# Patient Record
Sex: MALE | Race: WHITE | NOT HISPANIC OR LATINO | URBAN - METROPOLITAN AREA
[De-identification: names, ages, dates, MRNs, and addresses within clinical notes are randomized per-mention and may not be internally consistent; named-entity substitution may affect disease eponyms.]

---

## 2023-04-24 ENCOUNTER — OFFICE VISIT (OUTPATIENT)
Dept: URGENT CARE | Facility: URGENT CARE | Age: 43
End: 2023-04-24
Payer: COMMERCIAL

## 2023-04-24 ENCOUNTER — ANCILLARY PROCEDURE (OUTPATIENT)
Dept: GENERAL RADIOLOGY | Facility: CLINIC | Age: 43
End: 2023-04-24
Attending: NURSE PRACTITIONER
Payer: COMMERCIAL

## 2023-04-24 VITALS
SYSTOLIC BLOOD PRESSURE: 147 MMHG | RESPIRATION RATE: 18 BRPM | DIASTOLIC BLOOD PRESSURE: 83 MMHG | TEMPERATURE: 100.3 F | WEIGHT: 179 LBS | HEART RATE: 104 BPM | OXYGEN SATURATION: 98 %

## 2023-04-24 DIAGNOSIS — R53.81 MALAISE: ICD-10-CM

## 2023-04-24 DIAGNOSIS — R09.89 RHONCHI AT RIGHT LUNG BASE: ICD-10-CM

## 2023-04-24 DIAGNOSIS — J18.9 PNEUMONIA DUE TO INFECTIOUS ORGANISM, UNSPECIFIED LATERALITY, UNSPECIFIED PART OF LUNG: Primary | ICD-10-CM

## 2023-04-24 DIAGNOSIS — J45.40 MODERATE PERSISTENT ASTHMA DEPENDENT ON SYSTEMIC STEROIDS: ICD-10-CM

## 2023-04-24 DIAGNOSIS — R05.1 ACUTE COUGH: ICD-10-CM

## 2023-04-24 DIAGNOSIS — Z79.52 MODERATE PERSISTENT ASTHMA DEPENDENT ON SYSTEMIC STEROIDS: ICD-10-CM

## 2023-04-24 PROBLEM — R35.0 INCREASED FREQUENCY OF URINATION: Status: ACTIVE | Noted: 2019-01-03

## 2023-04-24 PROBLEM — I86.1 LEFT VARICOCELE: Status: ACTIVE | Noted: 2018-01-04

## 2023-04-24 PROBLEM — K76.0 HEPATIC STEATOSIS: Status: ACTIVE | Noted: 2019-01-03

## 2023-04-24 PROBLEM — K29.70 HELICOBACTER POSITIVE GASTRITIS: Status: ACTIVE | Noted: 2019-04-09

## 2023-04-24 PROBLEM — M89.8X7: Status: ACTIVE | Noted: 2018-10-17

## 2023-04-24 PROBLEM — B35.4 TINEA CORPORIS: Status: ACTIVE | Noted: 2021-02-20

## 2023-04-24 PROBLEM — M22.2X1 PATELLOFEMORAL PAIN SYNDROME OF BOTH KNEES: Status: ACTIVE | Noted: 2018-08-07

## 2023-04-24 PROBLEM — M17.11 PRIMARY OSTEOARTHRITIS OF RIGHT KNEE: Status: ACTIVE | Noted: 2023-02-09

## 2023-04-24 PROBLEM — M22.2X2 PATELLOFEMORAL PAIN SYNDROME OF BOTH KNEES: Status: ACTIVE | Noted: 2018-08-07

## 2023-04-24 PROBLEM — R00.2 PALPITATIONS: Status: ACTIVE | Noted: 2018-08-07

## 2023-04-24 PROBLEM — M79.10 MYALGIA: Status: ACTIVE | Noted: 2019-03-27

## 2023-04-24 PROBLEM — K21.9 GASTROESOPHAGEAL REFLUX DISEASE: Status: ACTIVE | Noted: 2019-03-27

## 2023-04-24 PROBLEM — B96.81 HELICOBACTER POSITIVE GASTRITIS: Status: ACTIVE | Noted: 2019-04-09

## 2023-04-24 LAB
DEPRECATED S PYO AG THROAT QL EIA: NEGATIVE
FLUAV AG SPEC QL IA: NEGATIVE
FLUBV AG SPEC QL IA: NEGATIVE
GROUP A STREP BY PCR: NOT DETECTED
RADIOLOGIST FLAGS: ABNORMAL

## 2023-04-24 PROCEDURE — 71046 X-RAY EXAM CHEST 2 VIEWS: CPT | Mod: TC | Performed by: RADIOLOGY

## 2023-04-24 PROCEDURE — 87651 STREP A DNA AMP PROBE: CPT | Performed by: NURSE PRACTITIONER

## 2023-04-24 PROCEDURE — 87804 INFLUENZA ASSAY W/OPTIC: CPT | Performed by: NURSE PRACTITIONER

## 2023-04-24 PROCEDURE — 99204 OFFICE O/P NEW MOD 45 MIN: CPT | Performed by: NURSE PRACTITIONER

## 2023-04-24 RX ORDER — PREDNISONE 20 MG/1
20 TABLET ORAL DAILY
Qty: 5 TABLET | Refills: 0 | Status: SHIPPED | OUTPATIENT
Start: 2023-04-24

## 2023-04-24 RX ORDER — BUDESONIDE AND FORMOTEROL FUMARATE DIHYDRATE 160; 4.5 UG/1; UG/1
2 AEROSOL RESPIRATORY (INHALATION)
COMMUNITY
Start: 2022-10-23

## 2023-04-24 RX ORDER — FLUTICASONE PROPIONATE 50 MCG
1 SPRAY, SUSPENSION (ML) NASAL DAILY
Qty: 16 G | Refills: 0 | Status: SHIPPED | OUTPATIENT
Start: 2023-04-24

## 2023-04-24 RX ORDER — DOXYCYCLINE HYCLATE 100 MG
100 TABLET ORAL 2 TIMES DAILY
Qty: 14 TABLET | Refills: 0 | Status: SHIPPED | OUTPATIENT
Start: 2023-04-24 | End: 2023-05-01

## 2023-04-24 NOTE — RESULT ENCOUNTER NOTE
Results discussed with patient in clinic. States understanding of these results.    Dai Mcfarlane CNP

## 2023-04-24 NOTE — PROGRESS NOTES
Assessment & Plan     1. Pneumonia due to infectious organism, unspecified laterality, unspecified part of lung    - predniSONE (DELTASONE) 20 MG tablet; Take 1 tablet (20 mg) by mouth daily  Dispense: 5 tablet; Refill: 0  - fluticasone (FLONASE) 50 MCG/ACT nasal spray; Spray 1 spray into both nostrils daily  Dispense: 16 g; Refill: 0  - doxycycline hyclate (VIBRA-TABS) 100 MG tablet; Take 1 tablet (100 mg) by mouth 2 times daily for 7 days  Dispense: 14 tablet; Refill: 0    2. Moderate persistent asthma dependent on systemic steroids    - Streptococcus A Rapid Screen w/Reflex to PCR - Clinic Collect  - Influenza A/B antigen  - XR Chest 2 Views  - Group A Streptococcus PCR Throat Swab  - predniSONE (DELTASONE) 20 MG tablet; Take 1 tablet (20 mg) by mouth daily  Dispense: 5 tablet; Refill: 0  - fluticasone (FLONASE) 50 MCG/ACT nasal spray; Spray 1 spray into both nostrils daily  Dispense: 16 g; Refill: 0  - doxycycline hyclate (VIBRA-TABS) 100 MG tablet; Take 1 tablet (100 mg) by mouth 2 times daily for 7 days  Dispense: 14 tablet; Refill: 0    3. Acute cough    - Streptococcus A Rapid Screen w/Reflex to PCR - Clinic Collect  - Influenza A/B antigen  - XR Chest 2 Views  - Group A Streptococcus PCR Throat Swab    4. Malaise    - Streptococcus A Rapid Screen w/Reflex to PCR - Clinic Collect  - Influenza A/B antigen  - XR Chest 2 Views  - Group A Streptococcus PCR Throat Swab    5. Rhonchi at right lung base    - XR Chest 2 Views    Home instructions reviewed recommend oral prednisone for asthma exacerbation, oral antibiotic as prescribed for pneumonia.   May use flonase daily to help decrease swelling and dry up drainage. Recommend saline lavage to help remove mucous and moisturize sinuses.     Close follow up with primary care in 3-4 weeks to have repeat chest xray.     ZAHIDA Cabrera [facility]    Dari Benoit is a 43 year old male who presents to clinic today for the  following health issues:  Chief Complaint   Patient presents with     Urgent Care     URI     Per patient states he started having cold the past three days congestion, body aches, headaches and cough dry. Patient states he has a history of asthma and has been using his inhaler.      HPI    Patient with history of moderate asthma, states he has been having to use inhaler more frequently. He denies SOB. Notes his children and wife have been ill with colds and respiratory infection. He states he has cough, wheezing, SOB, malaise unsure if febrile.       Review of Systems  Constitutional, HEENT, cardiovascular, pulmonary, gi and gu systems are negative, except as otherwise noted.      Objective    BP (!) 147/83   Pulse 104   Temp 100.3  F (37.9  C) (Tympanic)   Resp 18   Wt 81.2 kg (179 lb)   SpO2 98%   Physical Exam   GENERAL: alert and no distress  EYES: Eyes grossly normal to inspection, PERRL and conjunctivae and sclerae normal  HENT: normal cephalic/atraumatic, ear canals and TM's normal, nose and mouth without ulcers or lesions, nasal mucosa edematous , rhinorrhea clear and yellow, oropharynx clear, oral mucous membranes moist, tonsillar erythema and sinuses: maxillary, frontal tenderness on bilateral  NECK: bilateral anterior cervical adenopathy, no asymmetry, masses, or scars and thyroid normal to palpation  RESP: rhonchi R mid posterior and R lower posterior and expiratory wheezes throughout  CV: regular rate and rhythm, normal S1 S2, no S3 or S4, no murmur, click or rub, no peripheral edema and peripheral pulses strong  ABDOMEN: soft, nontender, no hepatosplenomegaly, no masses and bowel sounds normal  MS: no gross musculoskeletal defects noted, no edema  SKIN: no suspicious lesions or rashes    Results for orders placed or performed in visit on 04/24/23   XR Chest 2 Views     Status: Abnormal   Result Value Ref Range    Radiologist flags See above report. (Urgent)     Narrative    EXAM: XR CHEST 2  VIEWS  [facility]  DATE/TIME: 4/24/2023 4:31 PM CDT    INDICATION:  Moderate persistent asthma dependent on systemic steroids, Acute cough, Malaise, Rhonchi at right lung base  COMPARISON: None.      Impression    IMPRESSION: Patchy airspace opacities are noted in the lingula abutting the fissure and partially obscuring the heart border consistent with a lingular pneumonia.     Suggest a follow-up post treatment in 3-4 weeks.    No effusions. Right lung is clear. Heart and pulmonary vascularity are normal.      [Access Center: See above report.]    This report will be copied to the [facility] to ensure a provider acknowledges the finding. [facility] Center is available Monday through Friday 8am-3:30 pm.      Streptococcus A Rapid Screen w/Reflex to PCR - Clinic Collect     Status: Normal    Specimen: Throat; Swab   Result Value Ref Range    Group A Strep antigen Negative Negative   Influenza A/B antigen     Status: Normal    Specimen: Nose; Swab   Result Value Ref Range    Influenza A antigen Negative Negative    Influenza B antigen Negative Negative    Narrative    Test results must be correlated with clinical data. If necessary, results should be confirmed by a molecular assay or viral culture.

## 2023-05-08 ENCOUNTER — OFFICE VISIT (OUTPATIENT)
Dept: URGENT CARE | Facility: URGENT CARE | Age: 43
End: 2023-05-08
Payer: COMMERCIAL

## 2023-05-08 VITALS
RESPIRATION RATE: 16 BRPM | DIASTOLIC BLOOD PRESSURE: 81 MMHG | OXYGEN SATURATION: 98 % | TEMPERATURE: 98.7 F | SYSTOLIC BLOOD PRESSURE: 123 MMHG | WEIGHT: 179 LBS | HEART RATE: 106 BPM

## 2023-05-08 DIAGNOSIS — R11.10 VOMITING AND DIARRHEA: Primary | ICD-10-CM

## 2023-05-08 DIAGNOSIS — R19.7 VOMITING AND DIARRHEA: Primary | ICD-10-CM

## 2023-05-08 LAB
DEPRECATED S PYO AG THROAT QL EIA: NEGATIVE
GROUP A STREP BY PCR: NOT DETECTED

## 2023-05-08 PROCEDURE — 99213 OFFICE O/P EST LOW 20 MIN: CPT | Performed by: FAMILY MEDICINE

## 2023-05-08 PROCEDURE — 87651 STREP A DNA AMP PROBE: CPT | Performed by: FAMILY MEDICINE

## 2023-05-08 RX ORDER — BENZONATATE 100 MG/1
CAPSULE ORAL
COMMUNITY
Start: 2023-05-01

## 2023-05-08 RX ORDER — ONDANSETRON 4 MG/1
4 TABLET, ORALLY DISINTEGRATING ORAL EVERY 8 HOURS PRN
Qty: 6 TABLET | Refills: 0 | Status: SHIPPED | OUTPATIENT
Start: 2023-05-08

## 2023-05-08 NOTE — PATIENT INSTRUCTIONS
Take the medicine for nausea and vomiting.    Drink plenty of fluids    Eats when no longer vomiting and taking liquids.

## 2023-05-08 NOTE — PROGRESS NOTES
Assessment & Plan     Vomiting and diarrhea  Neg strep, viral GI disease, zofran  - ondansetron (ZOFRAN ODT) 4 MG ODT tab  Dispense: 6 tablet; Refill: 0             No follow-ups on file.    Hamilton Spicer MD  [facility] [place]    Subjective     Cy is a 43 year old male who presents to clinic today for the following health issues:  Chief Complaint   Patient presents with     Urgent Care     Vomiting     Per patient states symptoms started yesterday      HPI    Vomited yesterday.  Stomach pain.  Nausea.  Not able to drink as feels like vomiting.    Diarrhea today.  Son with same illness.        Review of Systems        Objective    /81   Pulse 106   Temp 98.7  F (37.1  C) (Tympanic)   Resp 16   Wt 81.2 kg (179 lb)   SpO2 98%   Physical Exam  Vitals and nursing note reviewed.   Constitutional:       Appearance: Normal appearance.   HENT:      Right Ear: Tympanic membrane normal.      Left Ear: Tympanic membrane normal.      Mouth/Throat:      Mouth: Mucous membranes are moist.   Eyes:      Pupils: Pupils are equal, round, and reactive to light.   Cardiovascular:      Rate and Rhythm: Normal rate and regular rhythm.      Pulses: Normal pulses.      Heart sounds: Normal heart sounds.   Pulmonary:      Effort: Pulmonary effort is normal.      Breath sounds: Normal breath sounds.   Abdominal:      General: Abdomen is flat.      Palpations: Abdomen is soft.      Tenderness: There is abdominal tenderness.   Musculoskeletal:      Cervical back: Neck supple.   Neurological:      Mental Status: He is alert.

## 2023-05-25 ENCOUNTER — OFFICE VISIT (OUTPATIENT)
Dept: URGENT CARE | Facility: URGENT CARE | Age: 43
End: 2023-05-25
Payer: COMMERCIAL

## 2023-05-25 ENCOUNTER — ANCILLARY PROCEDURE (OUTPATIENT)
Dept: GENERAL RADIOLOGY | Facility: CLINIC | Age: 43
End: 2023-05-25
Attending: NURSE PRACTITIONER
Payer: COMMERCIAL

## 2023-05-25 VITALS
TEMPERATURE: 98.4 F | HEART RATE: 126 BPM | OXYGEN SATURATION: 98 % | SYSTOLIC BLOOD PRESSURE: 122 MMHG | DIASTOLIC BLOOD PRESSURE: 80 MMHG | RESPIRATION RATE: 30 BRPM | WEIGHT: 181.4 LBS

## 2023-05-25 DIAGNOSIS — R07.9 CHEST PAIN, UNSPECIFIED TYPE: ICD-10-CM

## 2023-05-25 DIAGNOSIS — R06.82 TACHYPNEA: ICD-10-CM

## 2023-05-25 DIAGNOSIS — R05.1 ACUTE COUGH: ICD-10-CM

## 2023-05-25 DIAGNOSIS — R42 DIZZINESS: ICD-10-CM

## 2023-05-25 DIAGNOSIS — R00.0 TACHYCARDIA: ICD-10-CM

## 2023-05-25 DIAGNOSIS — R06.02 SHORTNESS OF BREATH: ICD-10-CM

## 2023-05-25 DIAGNOSIS — R07.9 CHEST PAIN, UNSPECIFIED TYPE: Primary | ICD-10-CM

## 2023-05-25 PROCEDURE — 93000 ELECTROCARDIOGRAM COMPLETE: CPT | Performed by: NURSE PRACTITIONER

## 2023-05-25 PROCEDURE — 99214 OFFICE O/P EST MOD 30 MIN: CPT | Performed by: NURSE PRACTITIONER

## 2023-05-25 PROCEDURE — 71046 X-RAY EXAM CHEST 2 VIEWS: CPT | Mod: TC | Performed by: RADIOLOGY

## 2023-05-25 NOTE — PROGRESS NOTES
Assessment & Plan     Chest pain, unspecified type  - XR Chest 2 Views  - EKG 12-lead complete w/read - Clinics    Dizziness    Shortness of breath  - XR Chest 2 Views    Acute cough  - XR Chest 2 Views    Tachycardia    Tachypnea       Reviewed EKG completed during visit showing sinus rhythm rate 90 without obvious ST elevation or depression. Reviewed xray images showing no obvious pneumonia, will notify if radiologist sees differently. Recommend further evaluation in emergency for shortness of breath, chest pain, dizziness with tachycardia and tachypnea as cannot rule out MI/PE in urgent care which can be life threatening. Patient agreeable and declines ambulance. He is discharged in stable condition           Maribel Marie NP  [facility]    Dari Benoit is a 43 year old male who presents to clinic today for the following health issues:  Chief Complaint   Patient presents with     Cough     Cough x1 week, sob when walking, pain in chest/back, dizziness from coughing      Patient presents for evaluation of cough. Symptoms have been present for the past week and have been worsening. Associated symptoms: chest pain, shortness of breath, dizziness. Denies fever, nasal congestion. He tested negative for COVID. He feels short of breath even with walking or talking at rest.     He was treated with doxycyline, flonase and prednisone 4/24/23 for pneumonia.     Problem list, Medication list, Allergies, and Medical history reviewed in EPIC.    ROS:  Review of systems negative except for noted above        Objective    /80   Pulse (!) 126   Temp 98.4  F (36.9  C) (Tympanic)   Resp 30   Wt 82.3 kg (181 lb 6.4 oz)   SpO2 98%   Physical Exam  Constitutional:       General: He is not in acute distress.     Appearance: He is not toxic-appearing or diaphoretic.   HENT:      Head: Normocephalic and atraumatic.      Right Ear: Tympanic membrane, ear canal and external ear normal.       Left Ear: Tympanic membrane, ear canal and external ear normal.      Nose: Nose normal.      Mouth/Throat:      Mouth: Mucous membranes are moist.      Pharynx: Oropharynx is clear. No oropharyngeal exudate or posterior oropharyngeal erythema.   Eyes:      Extraocular Movements: Extraocular movements intact.      Conjunctiva/sclera: Conjunctivae normal.      Pupils: Pupils are equal, round, and reactive to light.   Cardiovascular:      Rate and Rhythm: Regular rhythm. Tachycardia present.      Heart sounds: Normal heart sounds.   Pulmonary:      Effort: No respiratory distress.      Breath sounds: No wheezing, rhonchi or rales.      Comments: Tachypnea, increased respiratory effort, having to catch breath while talking, episodic coughing  Chest:      Chest wall: No tenderness.   Skin:     General: Skin is warm and dry.   Neurological:      General: No focal deficit present.      Mental Status: He is alert and oriented to person, place, and time.      Cranial Nerves: No cranial nerve deficit.      Sensory: No sensory deficit.      Motor: No weakness.      Coordination: Coordination normal.      Gait: Gait normal.          EKG completed during visit showing sinus rhythm rate 90 without obvious ST elevation or depression    X-ray chest was performed and reviewed independently by myself showing no obvious pneumonia  Radiologist impression:   Results for orders placed or performed in visit on 05/25/23   XR Chest 2 Views     Status: None    Narrative    EXAM: XR CHEST 2 VIEWS  LOCATION: [facility]  DATE/TIME: 5/25/2023 6:20 PM CDT    INDICATION: Cough, chest pain, shortness of breath.  COMPARISON: 04/24/2023.      Impression    IMPRESSION: No focal airspace consolidation. Interval resolution of previous lingular pneumonia. No pleural effusion or pneumothorax.    Cardiomediastinal silhouette is normal.

## 2023-05-30 DIAGNOSIS — J18.9 PNEUMONIA DUE TO INFECTIOUS ORGANISM, UNSPECIFIED LATERALITY, UNSPECIFIED PART OF LUNG: ICD-10-CM

## 2023-05-30 DIAGNOSIS — Z79.52 MODERATE PERSISTENT ASTHMA DEPENDENT ON SYSTEMIC STEROIDS: ICD-10-CM

## 2023-05-30 DIAGNOSIS — J45.40 MODERATE PERSISTENT ASTHMA DEPENDENT ON SYSTEMIC STEROIDS: ICD-10-CM

## 2023-06-01 RX ORDER — FLUTICASONE PROPIONATE 50 MCG
SPRAY, SUSPENSION (ML) NASAL
Qty: 16 G | Refills: 0 | OUTPATIENT
Start: 2023-06-01

## 2024-02-24 ENCOUNTER — ANCILLARY PROCEDURE (OUTPATIENT)
Dept: GENERAL RADIOLOGY | Facility: CLINIC | Age: 44
End: 2024-02-24
Attending: FAMILY MEDICINE
Payer: COMMERCIAL

## 2024-02-24 ENCOUNTER — OFFICE VISIT (OUTPATIENT)
Dept: URGENT CARE | Facility: URGENT CARE | Age: 44
End: 2024-02-24
Payer: COMMERCIAL

## 2024-02-24 VITALS
RESPIRATION RATE: 14 BRPM | DIASTOLIC BLOOD PRESSURE: 97 MMHG | OXYGEN SATURATION: 98 % | SYSTOLIC BLOOD PRESSURE: 144 MMHG | TEMPERATURE: 97.1 F | WEIGHT: 187.6 LBS | HEART RATE: 94 BPM

## 2024-02-24 DIAGNOSIS — R05.9 COUGH, UNSPECIFIED TYPE: ICD-10-CM

## 2024-02-24 DIAGNOSIS — J22 LOWER RESPIRATORY INFECTION: Primary | ICD-10-CM

## 2024-02-24 PROCEDURE — 71046 X-RAY EXAM CHEST 2 VIEWS: CPT | Mod: TC | Performed by: RADIOLOGY

## 2024-02-24 PROCEDURE — 99213 OFFICE O/P EST LOW 20 MIN: CPT | Performed by: FAMILY MEDICINE

## 2024-02-24 RX ORDER — AZITHROMYCIN 250 MG/1
TABLET, FILM COATED ORAL
Qty: 6 TABLET | Refills: 0 | Status: SHIPPED | OUTPATIENT
Start: 2024-02-24 | End: 2024-02-29

## 2024-02-24 RX ORDER — BENZONATATE 100 MG/1
100 CAPSULE ORAL 3 TIMES DAILY PRN
Qty: 30 CAPSULE | Refills: 0 | Status: SHIPPED | OUTPATIENT
Start: 2024-02-24

## 2024-02-24 NOTE — LETTER
February 24, 2024      Cy Weber  [address]  [place] [id number]        To Whom It May Concern:      Cy Weber  was seen on 02/24/2024.  Please excuse his work absence for today and tomorrow.       Let us know if there are any questions.      Sincerely,        Bud Rivas MD

## 2024-02-24 NOTE — PROGRESS NOTES
Assessment & Plan     Lower respiratory infection  Differentials discussed in detail including lower respiratory tract infection, acute bronchitis.  Chest x-ray findings reviewed independently which came back unremarkable, awaiting formal report.  Azithromycin and Tessalon prescribed.  Commended well hydration, warm fluids and to follow-up if symptoms persist or worsen.  - azithromycin (ZITHROMAX) 250 MG tablet; Take 2 tablets (500 mg) by mouth daily for 1 day, THEN 1 tablet (250 mg) daily for 4 days.    Cough, unspecified type  - XR Chest 2 Views; Future  - benzonatate (TESSALON) 100 MG capsule; Take 1 capsule (100 mg) by mouth 3 times daily as needed      Subjective   Cy is a 44 year old, presenting for the following health issues:  Urgent Care and Cough (Dry deep coughing, pain mostly upper right side of body )    HPI     Concern -   Onset: 2 weeks   Description: productive cough, congestion   Intensity: moderate  Progression of Symptoms:  same  Accompanying Signs & Symptoms: no fever, chills, sob,   Previous history of similar problem: h/o pneumonia, asthma   Therapies tried and outcome: None      Review of Systems  Constitutional, HEENT, cardiovascular, pulmonary, gi and gu systems are negative, except as otherwise noted.      Objective    BP (!) 144/97 (BP Location: Right arm, Patient Position: Sitting, Cuff Size: Adult Regular)   Pulse 94   Temp 97.1  F (36.2  C) (Oral)   Resp 14   Wt 85.1 kg (187 lb 9.6 oz)   SpO2 98%   There is no height or weight on file to calculate BMI.  Physical Exam   GENERAL: alert and no distress  EYES: Eyes grossly normal to inspection, PERRL and conjunctivae and sclerae normal  HENT: normal cephalic/atraumatic, ear canals and TM's normal, nose and mouth without ulcers or lesions, oropharynx clear, and oral mucous membranes moist  NECK: no adenopathy, no asymmetry, masses, or scars  RESP: lungs clear to auscultation - no rales, rhonchi or wheezes  CV: regular rate and  rhythm, normal S1 S2, no S3 or S4, no murmur, click or rub, no peripheral edema  MS: no gross musculoskeletal defects noted, no edema  NEURO: Normal strength and tone, mentation intact and speech normal  PSYCH: mentation appears normal, affect normal/bright        Signed Electronically by: Bud Rivas MD